# Patient Record
Sex: FEMALE | Race: BLACK OR AFRICAN AMERICAN | NOT HISPANIC OR LATINO | URBAN - METROPOLITAN AREA
[De-identification: names, ages, dates, MRNs, and addresses within clinical notes are randomized per-mention and may not be internally consistent; named-entity substitution may affect disease eponyms.]

---

## 2023-03-13 ENCOUNTER — OUTPATIENT (OUTPATIENT)
Dept: OUTPATIENT SERVICES | Facility: HOSPITAL | Age: 28
LOS: 1 days | End: 2023-03-13
Payer: MEDICAID

## 2023-03-13 DIAGNOSIS — K02.9 DENTAL CARIES, UNSPECIFIED: ICD-10-CM

## 2023-03-13 PROCEDURE — D0140: CPT

## 2023-03-13 PROCEDURE — D0220: CPT

## 2023-03-14 ENCOUNTER — OUTPATIENT (OUTPATIENT)
Dept: OUTPATIENT SERVICES | Facility: HOSPITAL | Age: 28
LOS: 1 days | End: 2023-03-14
Payer: MEDICAID

## 2023-03-14 ENCOUNTER — EMERGENCY (EMERGENCY)
Facility: HOSPITAL | Age: 28
LOS: 0 days | Discharge: LEFT BEFORE DISCHARGE | End: 2023-03-14
Attending: EMERGENCY MEDICINE
Payer: MEDICAID

## 2023-03-14 VITALS
HEART RATE: 99 BPM | SYSTOLIC BLOOD PRESSURE: 137 MMHG | TEMPERATURE: 98 F | RESPIRATION RATE: 18 BRPM | OXYGEN SATURATION: 96 % | DIASTOLIC BLOOD PRESSURE: 88 MMHG

## 2023-03-14 DIAGNOSIS — K02.9 DENTAL CARIES, UNSPECIFIED: ICD-10-CM

## 2023-03-14 DIAGNOSIS — K02.63 DENTAL CARIES ON SMOOTH SURFACE PENETRATING INTO PULP: ICD-10-CM

## 2023-03-14 DIAGNOSIS — Z91.018 ALLERGY TO OTHER FOODS: ICD-10-CM

## 2023-03-14 DIAGNOSIS — K08.89 OTHER SPECIFIED DISORDERS OF TEETH AND SUPPORTING STRUCTURES: ICD-10-CM

## 2023-03-14 DIAGNOSIS — K04.7 PERIAPICAL ABSCESS WITHOUT SINUS: ICD-10-CM

## 2023-03-14 DIAGNOSIS — Z53.29 PROCEDURE AND TREATMENT NOT CARRIED OUT BECAUSE OF PATIENT'S DECISION FOR OTHER REASONS: ICD-10-CM

## 2023-03-14 PROCEDURE — D0140: CPT

## 2023-03-14 PROCEDURE — D7140: CPT

## 2023-03-14 PROCEDURE — D0220: CPT

## 2023-03-14 PROCEDURE — 99284 EMERGENCY DEPT VISIT MOD MDM: CPT

## 2023-03-14 PROCEDURE — 99284 EMERGENCY DEPT VISIT MOD MDM: CPT | Mod: 25

## 2023-03-14 NOTE — CONSULT NOTE ADULT - SUBJECTIVE AND OBJECTIVE BOX
Patient is a 28y old  Female who presents with a chief complaint of "My face is swollen and I want to take my tooth out"    HPI: Patient presented to the dental clinic yesterday with lower left toothache. Patient offered the option for root canal therapy or extraction of #19. Patient elected to save the tooth with root canal therapy. Patient presents to clinic today with left facial swelling the size of a peach      PAST MEDICAL & SURGICAL HISTORY:    ( -  ) heart valve replacement  ( -  ) joint replacement  ( -  ) pregnancy    MEDICATIONS  (STANDING):  Paracetamol  MEDICATIONS  (PRN):      Allergies:  chicken      Intolerances    FAMILY HISTORY:  *SOCIAL HISTORY: (-   ) Tobacco; (   ) ETOH    *Last Dental Visit:    Vital Signs Last 24 Hrs  T(C): 36.6 (14 Mar 2023 12:27), Max: 36.6 (14 Mar 2023 12:27)  T(F): 97.8 (14 Mar 2023 12:27), Max: 97.8 (14 Mar 2023 12:27)  HR: 99 (14 Mar 2023 12:27) (99 - 99)  BP: 137/88 (14 Mar 2023 12:27) (137/88 - 137/88)  BP(mean): --  RR: 18 (14 Mar 2023 12:27) (18 - 18)  SpO2: 96% (14 Mar 2023 12:27) (96% - 96%)    Parameters below as of 14 Mar 2023 12:27  Patient On (Oxygen Delivery Method): room air    Vitals taken in clinic:  BP: 128/74  P: 93  T: 99.2        LABS:    EOE:  TMJ ( -  ) clicks                     (  - ) pops                     (  - ) crepitus             Mandible <<FROM>>             Facial bones and MOM <<grossly intact>>             (-  ) trismus             ( -  ) lymphadenopathy             (  + ) swelling             ( +  ) asymmetry             (  + ) palpation             (  - ) dyspnea             ( -  ) dysphagia             (  - ) loss of consciousness    IOE:  <<permanent dentition: multiple carious teeth           hard/soft palate:  ( -  ) palatal torus, <<No pathology noted>>           tongue/FOM <<No pathology noted>>           labial/buccal mucosa <<No pathology noted>>           (  + ) percussion           (  + ) palpation           (  + ) swelling            (  + ) abscess           ( +  ) sinus tract    Dentition present: << y  >>  Mobility: <<n  >>  Caries: <<  y >>         *DENTAL RADIOGRAPHS: no radiographs taken     RADIOLOGY & ADDITIONAL STUDIES:    *ASSESSMENT: necrotic #19 with acute periapical periodontitis. peach size facial swelling noted with vestibular abscess. draining fistule observed with draining pus       *PLAN: extraction #19    PROCEDURE:   Verbal and written consent given.  Anesthesia: << 1 carpule of 2% lidocaine with 1:000,000 epinephrine and 1 carpule of Mepivicaine HCL 3% administered as inferior alveolar nerve block. 2 carpules of 4% septocaine with 1:100,000 epinephrine administered as local infiltration.   >>   Treatment: <<treatment consequences explained to patient as per OS sheet dated 07/13/00. consent obtained site site completed. throat pack placed. #19 elevated and extracted non-surgically. envelope flap reflected on the buccal with periosteal elevated. granulation tissue removed. site curetted and irrigated copiously with saline. post-op radiograph taken-negative. hemostasis achieved. post-op instructions given verbal and written    >>     Resident Name, pager #

## 2023-03-14 NOTE — ED PROVIDER NOTE - PHYSICAL EXAMINATION
VITAL SIGNS: I have reviewed nursing notes and confirm.  CONSTITUTIONAL: well-appearing, non-toxic, NAD  SKIN: Warm dry, normal skin turgor  HEAD: NCAT  EYES: EOMI, no scleral icterus  ENT: Moist mucous membranes. +L lower dental abscess noted adjacent to teeth 21/22. Tolerating secretions. No respiratory distress.  NECK: Supple; non tender. Full ROM.   NEURO: normal motor. normal sensory. Normal gait.  PSYCH: Cooperative, appropriate.

## 2023-03-14 NOTE — CONSULT NOTE ADULT - ASSESSMENT
Patient is a 28y old  Female who presents with a chief complaint of "My face is swollen and I want to take my tooth out"    HPI: Patient presented to the dental clinic yesterday with lower left toothache. Patient offered the option for root canal therapy or extraction of #19. Patient elected to save the tooth with root canal therapy. Patient presents to clinic today with left facial swelling the size of a peach    Necrotic #19 with acute periapical periodontitis. peach size facial swelling noted with vestibular abscess. draining fistule observed with draining pus    RECOMMENDATIONS:  1) Amoxicillin 500mg take 1 tablet by mouth q8h for 7 days. Ibuprofen 600mg take 1 tablet by mouth q6h as needed for pain  2) Dental F/U with outpatient dentist for comprehensive dental care.   3) If any difficulty swallowing/breathing, fever occur, return to ER.

## 2023-03-14 NOTE — ED PROVIDER NOTE - OBJECTIVE STATEMENT
28F no reported PMHx presents for L lower dental pain and swelling progressing over the past week. No fever/chills, vomiting, difficulty swallowing. Not on antibiotics. No other complaints.

## 2023-03-14 NOTE — ED PROVIDER NOTE - ATTENDING CONTRIBUTION TO CARE
Patient with 1 week of worsening left lower dental pain and swelling.  Exam concerning for dental abscess transfer dental clinic.

## 2023-05-17 DIAGNOSIS — K08.9 DISORDER OF TEETH AND SUPPORTING STRUCTURES, UNSPECIFIED: ICD-10-CM
